# Patient Record
Sex: MALE | Race: AMERICAN INDIAN OR ALASKA NATIVE
[De-identification: names, ages, dates, MRNs, and addresses within clinical notes are randomized per-mention and may not be internally consistent; named-entity substitution may affect disease eponyms.]

---

## 2018-09-16 ENCOUNTER — HOSPITAL ENCOUNTER (EMERGENCY)
Dept: HOSPITAL 5 - ED | Age: 43
Discharge: HOME | End: 2018-09-16
Payer: SELF-PAY

## 2018-09-16 VITALS — DIASTOLIC BLOOD PRESSURE: 88 MMHG | SYSTOLIC BLOOD PRESSURE: 138 MMHG
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
## 2018-09-16 DIAGNOSIS — F17.200: ICD-10-CM

## 2018-09-16 DIAGNOSIS — S29.012A: Primary | ICD-10-CM
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
## 2018-09-16 DIAGNOSIS — I10: ICD-10-CM

## 2018-09-16 LAB
BASOPHILS # (AUTO): 0.1 K/MM3 (ref 0–0.1)
BASOPHILS NFR BLD AUTO: 1.2 % (ref 0–1.8)
BUN SERPL-MCNC: 10 MG/DL (ref 9–20)
BUN/CREAT SERPL: 11 %
CALCIUM SERPL-MCNC: 9.1 MG/DL (ref 8.4–10.2)
EOSINOPHIL # BLD AUTO: 0.1 K/MM3 (ref 0–0.4)
EOSINOPHIL NFR BLD AUTO: 1 % (ref 0–4.3)
HCT VFR BLD CALC: 46.5 % (ref 35.5–45.6)
HEMOLYSIS INDEX: 9
HGB BLD-MCNC: 15.6 GM/DL (ref 11.8–15.2)
LYMPHOCYTES # BLD AUTO: 2.9 K/MM3 (ref 1.2–5.4)
LYMPHOCYTES NFR BLD AUTO: 29.7 % (ref 13.4–35)
MCH RBC QN AUTO: 32 PG (ref 28–32)
MCHC RBC AUTO-ENTMCNC: 33 % (ref 32–34)
MCV RBC AUTO: 95 FL (ref 84–94)
MONOCYTES # (AUTO): 0.7 K/MM3 (ref 0–0.8)
MONOCYTES % (AUTO): 7.5 % (ref 0–7.3)
PLATELET # BLD: 223 K/MM3 (ref 140–440)
RBC # BLD AUTO: 4.92 M/MM3 (ref 3.65–5.03)

## 2018-09-16 PROCEDURE — 93005 ELECTROCARDIOGRAM TRACING: CPT

## 2018-09-16 PROCEDURE — 71046 X-RAY EXAM CHEST 2 VIEWS: CPT

## 2018-09-16 PROCEDURE — 99285 EMERGENCY DEPT VISIT HI MDM: CPT

## 2018-09-16 PROCEDURE — 71275 CT ANGIOGRAPHY CHEST: CPT

## 2018-09-16 PROCEDURE — 85379 FIBRIN DEGRADATION QUANT: CPT

## 2018-09-16 PROCEDURE — 80048 BASIC METABOLIC PNL TOTAL CA: CPT

## 2018-09-16 PROCEDURE — 84484 ASSAY OF TROPONIN QUANT: CPT

## 2018-09-16 PROCEDURE — 36415 COLL VENOUS BLD VENIPUNCTURE: CPT

## 2018-09-16 PROCEDURE — 93010 ELECTROCARDIOGRAM REPORT: CPT

## 2018-09-16 PROCEDURE — 85025 COMPLETE CBC W/AUTO DIFF WBC: CPT

## 2018-09-16 NOTE — EMERGENCY DEPARTMENT REPORT
Blank Doc





- Documentation


Documentation: 





Patient is a 43-year-old (gentleman who is complaining of acute onset of left 

back pain radiating to left chest.  Patient states that this is pleuritic hurts 

only when he takes a deep breath.  Patient denies any cough cold congestion, 

fevers at this time.  Patient's troponins are within normal limits as well as 

his EKG.  A d-dimer was sent which is positive the patient will be sent for CTA 

of the chest.  Patient be reassessed.

## 2018-09-16 NOTE — CAT SCAN REPORT
FINAL REPORT



EXAM:  CT ANGIO CHEST



HISTORY:  elevated ddimer and pleur CP 



TECHNIQUE:  CT angiography of the chest was performed.  IV

contrast was administered. Axial images and coronal and sagittal

reformatted images were obtained.



PRIORS:  None.



FINDINGS:  

There is no aortic dissection seen.

There is no significant mediastinal or hilar mass seen.

There are no pleural effusions seen.

There are no filling defects seen within the pulmonary arterial

circulation to suggest pulmonary embolism.

The lungs are clear.

There is no pneumothorax seen.





IMPRESSION:  

There is no pulmonary embolism or aortic dissection seen.

## 2018-09-16 NOTE — XRAY REPORT
FINAL REPORT



EXAM:  XR CHEST ROUTINE 2V



HISTORY:  INOCENCIO and chestpain 



TECHNIQUE:  PA and lateral views of the chest were obtained.

There are no previous studies available for comparison.



FINDINGS:  

The heart size is at the upper limits of normal. The thoracic

aorta is mildly tortuous. The lungs are clear. There is no

evidence of congestion or effusions. The skeletal structures do

not show any acute changes.



IMPRESSION:  

No acute cardiopulmonary process.

## 2018-09-16 NOTE — EMERGENCY DEPARTMENT REPORT
ED Chest Pain HPI





- General


Chief Complaint: Chest Pain


Stated Complaint: BACK PAIN


Time Seen by Provider: 09/16/18 09:24


Source: patient


Mode of arrival: Stretcher


Limitations: No Limitations





- History of Present Illness


Initial Comments: 





This is a 43-year-old male nontoxic, well nourished in appearance, no acute 

signs of distress presents to the ED with c/o of left mid-back pain that 

radiates to left sided chest pain area x1 day.  Patient describes pain as 

aching intermittently and is only when he takes deep breathes.  Patient stated 

that otherwise the patient does not have any chest pain.  Patient denies any 

upper respiratory symptoms.  Patient denies any shortness of breath, hemoptysis

, fever, chills, nausea, vomiting, headache, stiff neck, numbness, tingling, 

abdominal pain.  Patient denies pleuritic chest pain.  Patient denies any 

recent travels or long car rides.  Patient denies any recent surgeries or any 

sick contacts.  Patient denies any drug allergies.  PMH includes HTN.  Patient 

has been out of his medication for the past day and a takes losartan 50 mg and 

Nifedipine 20 mg daily.


MD Complaint: chest pain


-: days(s) (1)


Onset: other (during deep breathing)


Pain Location: other (left midback)


Pain Radiation: other (left chest)


Severity: mild


Severity scale (0 -10): 8


Quality: aching


Consistency: intermittent


Improves With: nothing


Worsens With: nothing


re: denies: nausea, vomting, diaphoresis, dyspnea, sense of impending doom


Other Symptoms: denies: cough, fever, syncope, rash, acid taste in mouth, leg 

swelling, palpitations, burping


Treatments Prior to Arrival: none


Aspirin use within the Past 7 Days: (0) No





- Related Data


On Oral Contraceptives: No


 Home Medications











 Medication  Instructions  Recorded  Confirmed  Last Taken


 


Losartan/Hydrochlorothiazide 1 tab PO DAILY 09/16/18 09/16/18 Unknown





[Losartan-Hctz 50-12.5 mg Tab]    


 


NIFEdipine [Nifedipine] 1 tab PO DAILY 09/16/18 09/16/18 Unknown








 Previous Rx's











 Medication  Instructions  Recorded  Last Taken  Type


 


Cyclobenzaprine [Flexeril] 10 mg PO QHS PRN #10 tablet 09/16/18 Unknown Rx


 


Ibuprofen [Motrin] 600 mg PO Q8H PRN #30 tablet 09/16/18 Unknown Rx


 


Losartan [Cozaar] 50 mg PO QDAY #30 tablet 09/16/18 Unknown Rx


 


NIFEdipine [Nifedipine] 20 mg PO DAILY #30 capsule 09/16/18 Unknown Rx











 Allergies











Allergy/AdvReac Type Severity Reaction Status Date / Time


 


No Known Allergies Allergy   Unverified 09/16/18 03:01














Heart Score





- HEART Score


History: Slightly suspicious


EKG: Normal


Age: < 45


Risk factors: 1-2 risk factors


Troponin: < normal limit


HEART Score: 1





ED Review of Systems


ROS: 


Stated complaint: BACK PAIN


Other details as noted in HPI





Constitutional: denies: chills, fever


Eyes: denies: eye pain, eye discharge, vision change


ENT: denies: ear pain, throat pain


Respiratory: denies: cough, shortness of breath, wheezing


Cardiovascular: chest pain.  denies: palpitations


Endocrine: no symptoms reported


Gastrointestinal: denies: abdominal pain, nausea, diarrhea


Genitourinary: denies: urgency, dysuria


Musculoskeletal: back pain.  denies: joint swelling, arthralgia


Skin: denies: rash, lesions


Neurological: denies: headache, weakness, paresthesias


Psychiatric: denies: anxiety, depression


Hematological/Lymphatic: denies: easy bleeding, easy bruising





ED Past Medical Hx





- Past Medical History


Hx Hypertension: Yes


Additional medical history: Obesity, Sleep Apnea





- Surgical History


Past Surgical History?: No





- Social History


Smoking Status: Current Every Day Smoker


Substance Use Type: None





- Medications


Home Medications: 


 Home Medications











 Medication  Instructions  Recorded  Confirmed  Last Taken  Type


 


Cyclobenzaprine [Flexeril] 10 mg PO QHS PRN #10 tablet 09/16/18  Unknown Rx


 


Ibuprofen [Motrin] 600 mg PO Q8H PRN #30 tablet 09/16/18  Unknown Rx


 


Losartan [Cozaar] 50 mg PO QDAY #30 tablet 09/16/18  Unknown Rx


 


Losartan/Hydrochlorothiazide 1 tab PO DAILY 09/16/18 09/16/18 Unknown History





[Losartan-Hctz 50-12.5 mg Tab]     


 


NIFEdipine [Nifedipine] 1 tab PO DAILY 09/16/18 09/16/18 Unknown History


 


NIFEdipine [Nifedipine] 20 mg PO DAILY #30 capsule 09/16/18  Unknown Rx














ED Physical Exam





- General


Limitations: No Limitations


General appearance: alert, in no apparent distress





- Head


Head exam: Present: atraumatic, normocephalic





- Eye


Eye exam: Present: normal appearance


Pupils: Present: normal accommodation





- ENT


ENT exam: Present: normal exam, mucous membranes moist





- Neck


Neck exam: Present: normal inspection, full ROM.  Absent: tenderness, 

meningismus, lymphadenopathy





- Respiratory


Respiratory exam: Present: normal lung sounds bilaterally.  Absent: respiratory 

distress, wheezes, rales, rhonchi, stridor, chest wall tenderness, accessory 

muscle use, decreased breath sounds, prolonged expiratory





- Cardiovascular


Cardiovascular Exam: Present: regular rate, normal rhythm, normal heart sounds.

  Absent: bradycardia, tachycardia, irregular rhythm, systolic murmur, 

diastolic murmur, rubs, gallop





- GI/Abdominal


GI/Abdominal exam: Present: soft, normal bowel sounds.  Absent: distended, 

tenderness, guarding, rebound, rigid, diminished bowel sounds





- Rectal


Rectal exam: Present: deferred





- Extremities Exam


Extremities exam: Present: normal inspection, full ROM, normal capillary refill





- Back Exam


Back exam: Present: normal inspection, full ROM, tenderness (left mid-back).  

Absent: CVA tenderness (R), CVA tenderness (L), muscle spasm, paraspinal 

tenderness, vertebral tenderness, rash noted





- Neurological Exam


Neurological exam: Present: alert, oriented X3, normal gait





- Psychiatric


Psychiatric exam: Present: normal affect, normal mood





- Skin


Skin exam: Present: warm, dry, intact, normal color.  Absent: rash





ED Course


 Vital Signs











  09/16/18 09/16/18 09/16/18





  02:51 03:24 09:27


 


Temperature 98.1 F  98.7 F


 


Pulse Rate 79 79 78


 


Respiratory 20  18





Rate   


 


Blood Pressure 182/113 182/113 


 


Blood Pressure   138/88





[Right]   


 


O2 Sat by Pulse 96  97





Oximetry   














- Reevaluation(s)


Reevaluation #1: 





09/16/18 12:30


Patient is speaking in full sentences with no signs of distress noted.





- Consultations


Consultation #1: 





09/16/18 12:30


Patient has been consulted with EVELINE Victoria about patient history, physical 

exam, and labs and examined and screened patient and agrees to ED plan of care.





MINH score





- Minh Score


Age > 65: (0) No


Aspirin use within the Past 7 Days: (0) No


3 or more CAD Risk Factors: (0) No


2 or more Angina events in past 24 hrs: (0) No


Known CAD with more than 50% Stenosis: (0) No


Elevated Cardiac Markers: (0) No


ST Deviation Greater than 0.5mm: (0) No


MINH Score: 0





ED Medical Decision Making





- Lab Data


Result diagrams: 


 09/16/18 03:55





 09/16/18 03:55





- Medical Decision Making





This is a 43-year-old male that presents with left muscle back pain and chest 

pain and HTN.  Patient is stable and was examined by me and EVELINE Victoria.  MINH 

and HEART score 0 pints. Wells criteria for DVT/SVT/PE 0 points. Negative 

slight elevated so a CTA obtained and dictated by the radiologist.  Patient is 

notified of the report with no questions noted..  EKG normal sinus rhythm with 

no significant changes in ST.  Chest xray dictated by the radiologist. PAtient 

is notified of the Xray report with no questions noted.  Labs within normal 

limits. Negative troponin x3.  Patient received Toradol IM and Catapres in the 

ED which she stated his symptoms are improving subsided.  I will discharge 

patient with Flexeril and Motrin. I also refilled patients HTN meds. Patient 

was instructed to Follow-up with a primary care/cardiologist doctor in 3-5 days 

for possible stress test and echocardiogram or if symptoms worsen and continue 

return to emergency room as soon as possible.  At time of discharge, the 

patient does not seem toxic or ill in appearance.  No acute signs of distress 

noted.  Patient agrees to discharge treatment plan of care.  No further 

questions noted by the patient.


Critical care attestation.: 


If time is entered above; I have spent that time in minutes in the direct care 

of this critically ill patient, excluding procedure time.








ED Disposition


Clinical Impression: 


Chest pain


Qualifiers:


 Chest pain type: unspecified Qualified Code(s): R07.9 - Chest pain, unspecified





Muscle strain of left upper back


Qualifiers:


 Encounter type: initial encounter Qualified Code(s): S29.012A - Strain of 

muscle and tendon of back wall of thorax, initial encounter





Disposition: DC-01 TO HOME OR SELFCARE


Is pt being admited?: No


Does the pt Need Aspirin: No


Condition: Stable


Instructions:  Cyclobenzaprine (By mouth), Chest Pain (ED)


Additional Instructions: 


Follow-up with a primary care/cardiologist doctor in 3-5 days for possible 

stress test and echocardiogram or if symptoms worsen and continue return to 

emergency room as soon as possible.  


Take ibuprofen and Flexeril as prescribed.  Do not operate heavy machinery 

while taking Flexeril due to sedation


Prescriptions: 


Cyclobenzaprine [Flexeril] 10 mg PO QHS PRN #10 tablet


 PRN Reason: Muscle Spasm


Ibuprofen [Motrin] 600 mg PO Q8H PRN #30 tablet


 PRN Reason: Pain


Losartan [Cozaar] 50 mg PO QDAY #30 tablet


NIFEdipine [Nifedipine] 20 mg PO DAILY #30 capsule


Referrals: 


PRIMARY CARE,MD [Primary Care Provider] - 3-5 Days


ESTHELA FONTAINE MD [Staff Physician] - 3-5 Days


ELVER ALLAN MD [Staff Physician] - 3-5 Days


[facility] [Outside] - 3-5 Days


Forms:  Work/School Release Form(ED)

## 2020-02-16 PROBLEM — 35489007 DEPRESSION: Status: ACTIVE | Noted: 2020-02-16

## 2020-02-16 PROBLEM — 398050005 DIVERTICULAR DISEASE OF COLON: Status: ACTIVE | Noted: 2020-02-16

## 2020-02-16 PROBLEM — 13644009 HIGH CHOLESTEROL: Status: ACTIVE | Noted: 2020-02-16

## 2020-02-16 PROBLEM — 38341003 HYPERTENSION: Status: ACTIVE | Noted: 2020-02-16

## 2021-08-28 ENCOUNTER — TELEPHONE ENCOUNTER (OUTPATIENT)
Dept: URBAN - METROPOLITAN AREA CLINIC 13 | Facility: CLINIC | Age: 46
End: 2021-08-28

## 2021-08-29 ENCOUNTER — TELEPHONE ENCOUNTER (OUTPATIENT)
Dept: URBAN - METROPOLITAN AREA CLINIC 13 | Facility: CLINIC | Age: 46
End: 2021-08-29

## 2021-08-29 RX ORDER — LOSARTAN POTASSIUM 50 MG/1
TABLET, FILM COATED ORAL
Status: ACTIVE | COMMUNITY

## 2021-08-29 RX ORDER — ATORVASTATIN CALCIUM 20 MG/1
TABLET ORAL
Status: ACTIVE | COMMUNITY

## 2021-08-29 RX ORDER — HYDROCHLOROTHIAZIDE 25 MG/1
TABLET ORAL
Status: ACTIVE | COMMUNITY

## 2021-08-29 RX ORDER — CITALOPRAM 20 MG/1
TABLET ORAL
Status: ACTIVE | COMMUNITY

## 2021-08-29 RX ORDER — TRAMADOL HYDROCHLORIDE 50 MG/1
TABLET, FILM COATED ORAL
Status: ACTIVE | COMMUNITY

## 2021-08-29 RX ORDER — AMLODIPINE BESYLATE 10 MG/1
TABLET ORAL
Status: ACTIVE | COMMUNITY